# Patient Record
Sex: FEMALE | Race: WHITE | URBAN - METROPOLITAN AREA
[De-identification: names, ages, dates, MRNs, and addresses within clinical notes are randomized per-mention and may not be internally consistent; named-entity substitution may affect disease eponyms.]

---

## 2019-11-26 ENCOUNTER — APPOINTMENT (RX ONLY)
Dept: URBAN - METROPOLITAN AREA CLINIC 321 | Facility: CLINIC | Age: 69
Setting detail: DERMATOLOGY
End: 2019-11-26

## 2019-11-26 DIAGNOSIS — L57.0 ACTINIC KERATOSIS: ICD-10-CM

## 2019-11-26 PROBLEM — D48.5 NEOPLASM OF UNCERTAIN BEHAVIOR OF SKIN: Status: ACTIVE | Noted: 2019-11-26

## 2019-11-26 PROCEDURE — 11102 TANGNTL BX SKIN SINGLE LES: CPT

## 2019-11-26 PROCEDURE — ? BIOPSY BY SHAVE METHOD

## 2019-11-26 ASSESSMENT — LOCATION ZONE DERM: LOCATION ZONE: FACE

## 2019-11-26 ASSESSMENT — LOCATION DETAILED DESCRIPTION DERM: LOCATION DETAILED: RIGHT CENTRAL ZYGOMA

## 2019-11-26 ASSESSMENT — LOCATION SIMPLE DESCRIPTION DERM: LOCATION SIMPLE: RIGHT ZYGOMA

## 2019-12-19 ENCOUNTER — APPOINTMENT (RX ONLY)
Dept: URBAN - METROPOLITAN AREA CLINIC 322 | Facility: CLINIC | Age: 69
Setting detail: DERMATOLOGY
End: 2019-12-19

## 2019-12-19 PROBLEM — D04.39 CARCINOMA IN SITU OF SKIN OF OTHER PARTS OF FACE: Status: ACTIVE | Noted: 2019-12-19

## 2019-12-19 PROCEDURE — ? MOHS SURGERY

## 2019-12-19 PROCEDURE — 17311 MOHS 1 STAGE H/N/HF/G: CPT

## 2019-12-19 PROCEDURE — 12052 INTMD RPR FACE/MM 2.6-5.0 CM: CPT

## 2019-12-19 NOTE — PROCEDURE: MOHS SURGERY
----- Message from Jesus Flores RN sent at 2/13/2018 10:05 AM CST -----  I spoke with pt, told her PA is approved and gave her [facility] infusion center, she will contact them for scheduling.   ----- Message -----     From: Jesus Flores RN     Sent: 2/13/2018       To: Jesus Flores RN        ----- Message -----     From: Jenna Salas MD     Sent: 2/12/2018  11:13 AM       To: Jesus Flores RN    Yes, please    ----- Message -----     From: Jesus Flores RN     Sent: 2/9/2018   1:22 PM       To: Jenna Salas MD    Yes pt just need to call their infusion center for scheduling, I see PA is not done yet do you want me to initiate one?  ----- Message -----     From: Jenna Salas MD     Sent: 2/9/2018   1:00 PM       To: [facility]    Can we give prolia at [facility]         Suturegard Intro: Intraoperative tissue expansion was performed, utilizing the SUTUREGARD device, in order to reduce wound tension.

## 2020-01-28 NOTE — PROCEDURE: MOHS SURGERY
ED Bcc Infiltrative Histology Text: There were numerous aggregates of basaloid cells demonstrating an infiltrative pattern.

## 2022-07-25 NOTE — PROCEDURE: MOHS SURGERY
From: Jeet Carcamo  To: Dr. Annie López: 7/24/2022 10:20 PM EDT  Subject: Low iron level    Hello, I was wondering whether I needed a prescribed medicine since my iron levels were flagged as low? Thank you! Bcc Histology Text: There were numerous aggregates of basaloid cells.

## 2023-03-07 NOTE — PROCEDURE: MOHS SURGERY
36.3 Area M Indication Text: Tumors in this location are included in Area M (cheek, forehead, scalp, neck, jawline and pretibial skin).  Mohs surgery is indicated for tumors in these anatomic locations. 37